# Patient Record
Sex: MALE | URBAN - METROPOLITAN AREA
[De-identification: names, ages, dates, MRNs, and addresses within clinical notes are randomized per-mention and may not be internally consistent; named-entity substitution may affect disease eponyms.]

---

## 2023-06-28 ENCOUNTER — SURGERY (OUTPATIENT)
Facility: LOCATION | Age: 63
End: 2023-06-28

## 2023-06-28 PROCEDURE — 66821 AFTER CATARACT LASER SURGERY: CPT | Performed by: OPHTHALMOLOGY

## 2023-07-20 ENCOUNTER — POST-OPERATIVE VISIT (OUTPATIENT)
Facility: LOCATION | Age: 63
End: 2023-07-20

## 2023-07-20 DIAGNOSIS — Z48.810 ENCOUNTER FOR SURGICAL AFTERCARE FOLLOWING SURGERY ON A SENSE ORGAN: Primary | ICD-10-CM

## 2023-07-20 PROCEDURE — 99024 POSTOP FOLLOW-UP VISIT: CPT | Performed by: OPHTHALMOLOGY

## 2023-07-20 ASSESSMENT — INTRAOCULAR PRESSURE
OD: 15
OS: 16

## 2023-07-20 NOTE — IMPRESSION/PLAN
Impression: S/P YAG PC OS - 22 Days. Encounter for surgical aftercare following surgery on a sense organ  [id number]. Plan: Secondary cataract is visually significant and interfering with patient's visual function. Patient desires to see better and have Capsulotomy surgery. Retina is sufficiently stable to allow safe cataract surgery. Recommend YAG Capsulotomy. Risks, benefits, and alternatives discussed with patient. Patient agrees to proceed with surgery.

## 2024-01-22 ENCOUNTER — OFFICE VISIT (OUTPATIENT)
Facility: LOCATION | Age: 64
End: 2024-01-22

## 2024-01-22 DIAGNOSIS — H43.812 VITREOUS DEGENERATION, LEFT EYE: Primary | ICD-10-CM

## 2024-01-22 PROCEDURE — 92014 COMPRE OPH EXAM EST PT 1/>: CPT | Performed by: OPHTHALMOLOGY

## 2024-01-22 ASSESSMENT — INTRAOCULAR PRESSURE
OD: 13
OS: 13